# Patient Record
Sex: MALE | Race: OTHER | HISPANIC OR LATINO | Employment: UNEMPLOYED | URBAN - METROPOLITAN AREA
[De-identification: names, ages, dates, MRNs, and addresses within clinical notes are randomized per-mention and may not be internally consistent; named-entity substitution may affect disease eponyms.]

---

## 2019-07-22 ENCOUNTER — HOSPITAL ENCOUNTER (EMERGENCY)
Facility: HOSPITAL | Age: 39
Discharge: HOME | End: 2019-07-22
Attending: EMERGENCY MEDICINE | Admitting: EMERGENCY MEDICINE

## 2019-07-22 ENCOUNTER — APPOINTMENT (EMERGENCY)
Dept: RADIOLOGY | Facility: HOSPITAL | Age: 39
End: 2019-07-22

## 2019-07-22 VITALS
SYSTOLIC BLOOD PRESSURE: 131 MMHG | OXYGEN SATURATION: 95 % | WEIGHT: 159.61 LBS | RESPIRATION RATE: 18 BRPM | HEART RATE: 74 BPM | DIASTOLIC BLOOD PRESSURE: 83 MMHG | TEMPERATURE: 97.4 F

## 2019-07-22 DIAGNOSIS — T07.XXXA MULTIPLE ABRASIONS: Primary | ICD-10-CM

## 2019-07-22 DIAGNOSIS — T07.XXXA MULTIPLE CONTUSIONS: ICD-10-CM

## 2019-07-22 DIAGNOSIS — S63.90XA HAND SPRAIN: ICD-10-CM

## 2019-07-22 PROCEDURE — 90715 TDAP VACCINE 7 YRS/> IM: CPT | Performed by: EMERGENCY MEDICINE

## 2019-07-22 PROCEDURE — 90471 IMMUNIZATION ADMIN: CPT

## 2019-07-22 PROCEDURE — 73080 X-RAY EXAM OF ELBOW: CPT

## 2019-07-22 PROCEDURE — 96372 THER/PROPH/DIAG INJ SC/IM: CPT

## 2019-07-22 PROCEDURE — 73090 X-RAY EXAM OF FOREARM: CPT

## 2019-07-22 PROCEDURE — 99283 EMERGENCY DEPT VISIT LOW MDM: CPT | Performed by: EMERGENCY MEDICINE

## 2019-07-22 PROCEDURE — 99283 EMERGENCY DEPT VISIT LOW MDM: CPT

## 2019-07-22 PROCEDURE — 73130 X-RAY EXAM OF HAND: CPT

## 2019-07-22 RX ORDER — KETOROLAC TROMETHAMINE 30 MG/ML
15 INJECTION, SOLUTION INTRAMUSCULAR; INTRAVENOUS ONCE
Status: COMPLETED | OUTPATIENT
Start: 2019-07-22 | End: 2019-07-22

## 2019-07-22 RX ADMIN — TETANUS TOXOID, REDUCED DIPHTHERIA TOXOID AND ACELLULAR PERTUSSIS VACCINE, ADSORBED 0.5 ML: 5; 2.5; 8; 8; 2.5 SUSPENSION INTRAMUSCULAR at 02:44

## 2019-07-22 RX ADMIN — KETOROLAC TROMETHAMINE 15 MG: 30 INJECTION, SOLUTION INTRAMUSCULAR; INTRAVENOUS at 02:41
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
## 2019-07-26 ENCOUNTER — HOSPITAL ENCOUNTER (EMERGENCY)
Facility: HOSPITAL | Age: 39
Discharge: HOME | End: 2019-07-27
Attending: EMERGENCY MEDICINE | Admitting: EMERGENCY MEDICINE

## 2019-07-26 DIAGNOSIS — T40.1X1A HEROIN OVERDOSE (HCC): Primary | ICD-10-CM

## 2019-07-26 PROCEDURE — 99284 EMERGENCY DEPT VISIT MOD MDM: CPT

## 2019-07-26 PROCEDURE — 99283 EMERGENCY DEPT VISIT LOW MDM: CPT | Performed by: EMERGENCY MEDICINE

## 2019-07-26 RX ORDER — ACETAMINOPHEN 325 MG/1
1000 TABLET ORAL ONCE
Status: COMPLETED | OUTPATIENT
Start: 2019-07-26 | End: 2019-07-26

## 2019-07-26 RX ADMIN — ACETAMINOPHEN 975 MG: 325 TABLET ORAL at 23:12

## 2019-07-27 VITALS
RESPIRATION RATE: 16 BRPM | SYSTOLIC BLOOD PRESSURE: 111 MMHG | DIASTOLIC BLOOD PRESSURE: 62 MMHG | HEART RATE: 62 BPM | OXYGEN SATURATION: 97 % | TEMPERATURE: 98.6 F

## 2019-07-27 RX ORDER — IBUPROFEN 600 MG/1
600 TABLET ORAL ONCE
Status: DISCONTINUED | OUTPATIENT
Start: 2019-07-27 | End: 2019-07-27 | Stop reason: HOSPADM
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
## 2019-07-27 NOTE — ED NOTES
Dr Lesvia Aponte made aware that the patient wakes easily, but is still complaining of generalized all over body pain and had Tylenol previously  She is willing to give Motrin if patient will take it, this nurse did go back into the room to check on the patient, he is sleeping at the time, will allow him to sleep for a little while and re-check in a while       Ky Gómez, OLGA  07/27/19 0115

## 2019-08-07 ENCOUNTER — HOSPITAL ENCOUNTER (EMERGENCY)
Facility: HOSPITAL | Age: 39
Discharge: LEFT BEFORE DISCHARGE | End: 2019-08-07
Attending: EMERGENCY MEDICINE

## 2019-08-07 VITALS
OXYGEN SATURATION: 96 % | HEART RATE: 75 BPM | WEIGHT: 154.2 LBS | DIASTOLIC BLOOD PRESSURE: 62 MMHG | SYSTOLIC BLOOD PRESSURE: 114 MMHG | RESPIRATION RATE: 16 BRPM | TEMPERATURE: 98.3 F

## 2019-08-07 DIAGNOSIS — F11.10 HEROIN ABUSE (HCC): Primary | ICD-10-CM

## 2019-08-07 PROCEDURE — 99284 EMERGENCY DEPT VISIT MOD MDM: CPT

## 2019-08-07 PROCEDURE — 99283 EMERGENCY DEPT VISIT LOW MDM: CPT | Performed by: EMERGENCY MEDICINE

## 2019-08-07 RX ORDER — NALOXONE HYDROCHLORIDE 1 MG/ML
2 INJECTION INTRAMUSCULAR; INTRAVENOUS; SUBCUTANEOUS ONCE
Status: DISCONTINUED | OUTPATIENT
Start: 2019-08-07 | End: 2019-08-08 | Stop reason: HOSPADM

## 2019-08-07 RX ORDER — NALOXONE HYDROCHLORIDE 0.4 MG/ML
2 INJECTION, SOLUTION INTRAMUSCULAR; INTRAVENOUS; SUBCUTANEOUS ONCE
Status: DISCONTINUED | OUTPATIENT
Start: 2019-08-07 | End: 2019-08-07

## 2019-08-08 NOTE — ED PROVIDER NOTES
History  Chief Complaint   Patient presents with    Vomiting     nausea and vomiting x 2 weeks    Flank Pain     pain in his lower back tonight pt falling asleep during triage "took pain killers, 4 of them but i cant remember" PT also admits to injecting 2 bags of heroin tonight     79-year-old male presents for evaluation of seeking heroin detoxification  Patient admits to injecting 2 bags of heroin IV earlier tonight and came to the emergency department because he wants to quit  Patient denies flank pain or back pain at this time  Patient is falling asleep during evaluation  Will administer Narcan intranasally and reassess  None       Past Medical History:   Diagnosis Date    Drug abuse [facility])        History reviewed  No pertinent surgical history  History reviewed  No pertinent family history  I have reviewed and agree with the history as documented  Social History     Tobacco Use    Smoking status: Current Every Day Smoker     Packs/day: 1 00    Smokeless tobacco: Never Used   Substance Use Topics    Alcohol use: Yes     Comment: occasional    Drug use: Yes     Types: Heroin        Review of Systems   Constitutional: Negative for chills and fever  Musculoskeletal: Negative for back pain and neck pain  Neurological: Negative for syncope and headaches  All other systems reviewed and are negative  Physical Exam  Physical Exam   Constitutional: He appears well-developed and well-nourished  HENT:   Head: Normocephalic  Eyes: Pupils are equal, round, and reactive to light  EOM are normal    Pupils 3 mm equal reactive   Neck: Normal range of motion  Neck supple  Cardiovascular: Normal rate and regular rhythm  Pulmonary/Chest: Effort normal  No respiratory distress  Abdominal: Soft  There is no tenderness  Musculoskeletal: Normal range of motion  Moving all extremities  Following commands  Skin: Skin is warm  Capillary refill takes less than 2 seconds     Nursing note and vitals reviewed  Vital Signs  ED Triage Vitals [08/07/19 2239]   Temperature Pulse Respirations Blood Pressure SpO2   98 3 °F (36 8 °C) 75 16 114/62 96 %      Temp Source Heart Rate Source Patient Position - Orthostatic VS BP Location FiO2 (%)   Tympanic Monitor Sitting Left arm --      Pain Score       8           Vitals:    08/07/19 2239   BP: 114/62   Pulse: 75   Patient Position - Orthostatic VS: Sitting         Visual Acuity      ED Medications  Medications   naloxone (NARCAN) injection 2 mg (2 mg Intravenous Not Given 8/7/19 2313)   naloxone [facility]) injection 2 mg (2 mg Intravenous Not Given 8/7/19 2313)       Diagnostic Studies  Results Reviewed     Procedure Component Value Units Date/Time    Rapid drug screen, urine [[id number]]     Lab Status:  No result Specimen:  Urine     POCT alcohol breath test [[id number]]     Lab Status:  No result                  No orders to display              Procedures  Procedures       ED Course                               MDM  Number of Diagnoses or Management Options  Heroin abuse [facility]):   Diagnosis management comments: 55-year-old male presenting with heroin abuse  Informed by nurse that patient did not want Narcan  He was awake and alert and wanted to leave  Ambulating without difficulty  Advised to stay for further evaluation but patient declined  Oriented x3 and able to answer questions  Patient eloped prior to full reassessment  Disposition  Final diagnoses:   Heroin abuse ([address] )     Time reflects when diagnosis was documented in both MDM as applicable and the Disposition within this note     Time User Action Codes Description Comment    8/7/2019 11:15 PM Natalie Mercado Add [F11 10] Heroin abuse [facility])       ED Disposition     ED Disposition Condition Date/Time Comment    Eloped  Wed Aug 7, 2019 11:15 PM       Follow-up Information    None         There are no discharge medications for this patient  No discharge procedures on file      ED Provider  Electronically Signed by           Bekah Agudelo DO  08/07/19 9419

## 2019-08-08 NOTE — ED NOTES
Pt  Eloping from room 12    Jerone Kato Jerone Kato Dr Jamel Runner made aware                          Jyothi Blackwood, OLGA  08/07/19 9315

## 2019-08-08 NOTE — ED NOTES
Pt  Now reporting that he wants to leave & Dr Niall Rey made aware of pt   Desire to leave     Miguel Henning RN  08/07/19 4750

## 2019-08-10 ENCOUNTER — HOSPITAL ENCOUNTER (EMERGENCY)
Facility: HOSPITAL | Age: 39
Discharge: LEFT BEFORE DISCHARGE | End: 2019-08-10
Attending: EMERGENCY MEDICINE

## 2019-08-10 VITALS
SYSTOLIC BLOOD PRESSURE: 124 MMHG | DIASTOLIC BLOOD PRESSURE: 69 MMHG | HEART RATE: 85 BPM | WEIGHT: 154.32 LBS | TEMPERATURE: 99.4 F | OXYGEN SATURATION: 96 % | RESPIRATION RATE: 16 BRPM

## 2019-08-10 DIAGNOSIS — F19.90 RECREATIONAL DRUG USE: Primary | ICD-10-CM

## 2019-08-10 PROCEDURE — 99284 EMERGENCY DEPT VISIT MOD MDM: CPT

## 2019-08-10 PROCEDURE — 99282 EMERGENCY DEPT VISIT SF MDM: CPT | Performed by: PHYSICIAN ASSISTANT

## 2019-08-10 NOTE — ED PROVIDER NOTES
History  Chief Complaint   Patient presents with    Recreational Drug Use     brought in by EMS after  it was reported that he was rolling around on the grass - stopped when yelled at by EMS - pt  denies c/o's - admits to using heroin 3 bags this am  - no narcan given       Patient is a 50-year-old male presents today via EMS for recreational drug use  Patient is alert and oriented on arrival, EMS reports the patient did not require any Narcan and was alert and oriented upon arrival for the paramedics as well  Patient reports no complaints at this time and does admit to using 2 bags of heroin the did attempt to get high  Patient denies any suicidal ideations and notes that he did not inject heroin in an attempt to hurt or kill himself  Patient eloped prior to HOST evaluation  Patient was conscious alert and oriented with no gait abnormalities  Patient requested rehabilitation services, but then eloped  History provided by:  Patient and EMS personnel  Addiction Problem   Similar prior episodes: yes    Severity:  Mild  Onset quality:  Gradual  Duration:  1 day  Timing:  Constant  Progression:  Waxing and waning  Chronicity:  Chronic  Suspected agents:  Heroin  Associated symptoms: no abdominal pain, no nausea, no palpitations, no shortness of breath and no vomiting    Risk factors: no addiction treatment        None       Past Medical History:   Diagnosis Date    Drug abuse [facility])        Past Surgical History:   Procedure Laterality Date    TONSILLECTOMY         History reviewed  No pertinent family history  I have reviewed and agree with the history as documented  Social History     Tobacco Use    Smoking status: Current Every Day Smoker     Packs/day: 1 00    Smokeless tobacco: Never Used   Substance Use Topics    Alcohol use: Yes     Comment: occasional    Drug use: Yes     Types: Heroin        Review of Systems   Constitutional: Negative for chills, fatigue and fever     HENT: Negative for congestion, ear pain, rhinorrhea and sore throat  Eyes: Negative for redness  Respiratory: Negative for chest tightness and shortness of breath  Cardiovascular: Negative for chest pain and palpitations  Gastrointestinal: Negative for abdominal pain, nausea and vomiting  Genitourinary: Negative for dysuria and hematuria  Musculoskeletal: Negative  Skin: Negative for rash  Neurological: Negative for dizziness, syncope, light-headedness and numbness  Physical Exam  Physical Exam   Constitutional: He is oriented to person, place, and time  He appears well-developed and well-nourished  HENT:   Head: Normocephalic and atraumatic  Eyes: Pupils are equal, round, and reactive to light  Conjunctivae and EOM are normal    3mm b/l   Neck: Normal range of motion  Cardiovascular: Normal rate, regular rhythm and normal heart sounds  Pulmonary/Chest: Effort normal and breath sounds normal    Abdominal: Soft  There is no tenderness  There is no guarding  Lymphadenopathy:     He has no cervical adenopathy  Neurological: He is alert and oriented to person, place, and time  GCS eye subscore is 4  GCS verbal subscore is 5  GCS motor subscore is 6  CAOx4 No gait abnormalities   Skin: Skin is warm and dry  Capillary refill takes less than 2 seconds  Psychiatric: He has a normal mood and affect  Nursing note and vitals reviewed        Vital Signs  ED Triage Vitals [08/10/19 1520]   Temperature Pulse Respirations Blood Pressure SpO2   99 4 °F (37 4 °C) 85 16 124/69 96 %      Temp Source Heart Rate Source Patient Position - Orthostatic VS BP Location FiO2 (%)   Tympanic Monitor Sitting Left arm --      Pain Score       --           Vitals:    08/10/19 1520   BP: 124/69   Pulse: 85   Patient Position - Orthostatic VS: Sitting         Visual Acuity      ED Medications  Medications - No data to display    Diagnostic Studies  Results Reviewed     None                 No orders to display Procedures  Procedures       ED Course                               MDM    Disposition  Final diagnoses:   Recreational drug use     Time reflects when diagnosis was documented in both MDM as applicable and the Disposition within this note     Time User Action Codes Description Comment    8/10/2019  4:12 PM Izzy Ortiz [F19 90] Recreational drug use       ED Disposition     ED Disposition Condition Date/Time Comment    Eloped  Sat Aug 10, 2019  4:12 PM       Follow-up Information    None         There are no discharge medications for this patient  No discharge procedures on file      ED Provider  Electronically Signed by           Walter Barros PA-C  08/10/19 5283

## 2019-08-10 NOTE — ED NOTES
Insurance A     EVS (Eligibility Verification System) called - [phone]    Automated system indicates: not active with medical assistance

## 2019-08-11 ENCOUNTER — HOSPITAL ENCOUNTER (EMERGENCY)
Facility: HOSPITAL | Age: 39
Discharge: HOME | End: 2019-08-11
Attending: EMERGENCY MEDICINE | Admitting: EMERGENCY MEDICINE

## 2019-08-11 VITALS
WEIGHT: 152.78 LBS | HEART RATE: 71 BPM | RESPIRATION RATE: 16 BRPM | SYSTOLIC BLOOD PRESSURE: 121 MMHG | DIASTOLIC BLOOD PRESSURE: 67 MMHG | OXYGEN SATURATION: 96 % | BODY MASS INDEX: 23.98 KG/M2 | HEIGHT: 67 IN | TEMPERATURE: 99.3 F

## 2019-08-11 DIAGNOSIS — F19.10 DRUG ABUSE (HCC): Primary | ICD-10-CM

## 2019-08-11 PROCEDURE — 99282 EMERGENCY DEPT VISIT SF MDM: CPT | Performed by: EMERGENCY MEDICINE

## 2019-08-11 PROCEDURE — 99284 EMERGENCY DEPT VISIT MOD MDM: CPT

## 2019-08-11 RX ORDER — NALOXONE HYDROCHLORIDE 1 MG/ML
INJECTION INTRAMUSCULAR; INTRAVENOUS; SUBCUTANEOUS
Status: COMPLETED
Start: 2019-08-11 | End: 2019-08-11

## 2019-08-12 NOTE — ED PROVIDER NOTES
History  Chief Complaint   Patient presents with    Overdose - Accidental     Overdose on heroin and ETOH       History provided by:  Patient  Overdose - Accidental   Ingested substance:  Illicit drugs  Suspected agents: Heroin  Context: recreational    Context comment:  Patient given 2 mg of intranasal Narcan by medics with her awakened and brought into the emergency department  Patient awake alert at this point time  Admits to drinking a small amount of alcohol earlier today as well  Associated symptoms: no abdominal pain, no chest pain, no cough, no diarrhea, no headaches, no nausea and no shortness of breath    Risk factors: no hx of self injury, no hx of sucide attempts and no similar prior episodes        None       Past Medical History:   Diagnosis Date    Drug abuse [facility])        Past Surgical History:   Procedure Laterality Date    TONSILLECTOMY         History reviewed  No pertinent family history  I have reviewed and agree with the history as documented  Social History     Tobacco Use    Smoking status: Current Every Day Smoker     Packs/day: 1 00    Smokeless tobacco: Never Used   Substance Use Topics    Alcohol use: Yes     Comment: occasional    Drug use: Yes     Types: Heroin        Review of Systems   Constitutional: Negative for chills and fever  HENT: Negative for rhinorrhea, sore throat and trouble swallowing  Eyes: Negative for pain  Respiratory: Negative for cough, shortness of breath, wheezing and stridor  Cardiovascular: Negative for chest pain and leg swelling  Gastrointestinal: Negative for abdominal pain, diarrhea and nausea  Endocrine: Negative for polyuria  Genitourinary: Negative for dysuria, flank pain and urgency  Musculoskeletal: Negative for joint swelling, myalgias and neck stiffness  Skin: Negative for rash  Allergic/Immunologic: Negative for immunocompromised state     Neurological: Negative for dizziness, syncope, weakness, numbness and headaches  Psychiatric/Behavioral: Negative for confusion and suicidal ideas  All other systems reviewed and are negative  Physical Exam  Physical Exam   Constitutional: He is oriented to person, place, and time  He appears well-developed and well-nourished  HENT:   Head: Normocephalic and atraumatic  Eyes: Pupils are equal, round, and reactive to light  EOM are normal    Neck: Normal range of motion  Neck supple  Cardiovascular: Normal rate and regular rhythm  Exam reveals no friction rub  No murmur heard  Pulmonary/Chest: Breath sounds normal  No respiratory distress  He has no wheezes  He has no rales  Abdominal: Soft  Bowel sounds are normal  He exhibits no distension  There is no tenderness  Musculoskeletal: Normal range of motion  He exhibits no edema or tenderness  Neurological: He is alert and oriented to person, place, and time  Skin: Skin is warm  No rash noted  Psychiatric: He has a normal mood and affect  Nursing note and vitals reviewed  Vital Signs  ED Triage Vitals [08/11/19 2041]   Temperature Pulse Respirations Blood Pressure SpO2   99 3 °F (37 4 °C) 87 20 101/83 97 %      Temp Source Heart Rate Source Patient Position - Orthostatic VS BP Location FiO2 (%)   Tympanic Monitor Lying Left arm --      Pain Score       No Pain           Vitals:    08/11/19 2041 08/11/19 2100 08/11/19 2130 08/11/19 2145   BP: 101/83 121/67     Pulse: 87 76 70 71   Patient Position - Orthostatic VS: Lying Lying           Visual Acuity      ED Medications  Medications   naloxone (NARCAN) 2 MG/2ML injection **ADS Override Pull** (  Given to EMS 8/11/19 2029)       Diagnostic Studies  Results Reviewed     None                 No orders to display              Procedures  Procedures       ED Course  ED Course as of Aug 11 2220   Bridger eMndez Aug 11, 2019   2217 Patient awake alert at this point time stable for outpatient management ambulatory in the department no difficulty  MDM  Number of Diagnoses or Management Options  Drug abuse [facility]): new and requires workup  Diagnosis management comments: 18-year-old male with a history of heroin abuse who presents emergency department after arrival after 2 mg of intranasal Narcan  He was observed in the emergency department for some time after sleeping for some time in the emergency department he is awake alert able to ambulate and with no difficulty  No indication for laboratory testing at this point time is vital signs are stable 99 3 at pulse ox 96% with no evidence of respiratory distress  Lungs early trich clear to examination stable for outpatient management no further need for Narcan in the department  Pt re-examined and evaluated after testing and treatment  Spoke with the patient and feeling improved and sxs have resolved  Will discharge home with close f/u with pcp and instructed to return to the ED if sxs worsen or continue  Pt agrees with the plan for discharge and feels comfortable to go home with proper f/u  Advised to return for worsening or additional problems  Diagnostic tests were reviewed and questions answered  Diagnosis, care plan and treatment options were discussed  The patient understand instructions and will follow up as directed  Counseling: I had a detailed discussion with the patient and/or guardian regarding: the historical points, exam findings, and any diagnostic results supporting the discharge diagnosis, lab results, radiology results, discharge instructions reviewed with patient and/or family/caregiver and understanding was verbalized  Instructions given to return to the emergency department if symptoms worsen or persist, or if there are any questions or concerns that arise at home         Disposition  Final diagnoses:   Drug abuse ([facility] [address] )     Time reflects when diagnosis was documented in both MDM as applicable and the Disposition within this note     Time User Action Codes Description Comment    8/11/2019  9:30 PM Nadege Lizama Add [K79 [id number]] Drug abuse [facility])       ED Disposition     ED Disposition Condition Date/Time Comment    Discharge Stable Sun Aug 11, 2019  9:30 PM Kwame Cheek discharge to home/self care  Follow-up Information     Follow up With Specialties Details Why Contact Info Additional [id number]   [address], [address] [facility] [id number]  [address], [address], [address] [place], [place], [address]          There are no discharge medications for this patient  No discharge procedures on file      ED Provider  Electronically Signed by           Fleet Jeans, DO  08/11/19 4632